# Patient Record
Sex: MALE | Race: BLACK OR AFRICAN AMERICAN | Employment: UNEMPLOYED | URBAN - METROPOLITAN AREA
[De-identification: names, ages, dates, MRNs, and addresses within clinical notes are randomized per-mention and may not be internally consistent; named-entity substitution may affect disease eponyms.]

---

## 2017-08-08 ENCOUNTER — HOSPITAL ENCOUNTER (EMERGENCY)
Age: 27
Discharge: HOME | End: 2017-08-08
Attending: EMERGENCY MEDICINE
Payer: MEDICAID

## 2017-08-08 VITALS
DIASTOLIC BLOOD PRESSURE: 96 MMHG | RESPIRATION RATE: 16 BRPM | HEART RATE: 97 BPM | TEMPERATURE: 99.9 F | HEIGHT: 69 IN | WEIGHT: 269.62 LBS | BODY MASS INDEX: 39.93 KG/M2 | OXYGEN SATURATION: 100 % | SYSTOLIC BLOOD PRESSURE: 192 MMHG

## 2017-08-08 DIAGNOSIS — J02.0 ACUTE STREPTOCOCCAL PHARYNGITIS: Primary | ICD-10-CM

## 2017-08-08 DIAGNOSIS — R03.0 ELEVATED BLOOD PRESSURE READING: ICD-10-CM

## 2017-08-08 LAB — DEPRECATED S PYO AG THROAT QL EIA: POSITIVE

## 2017-08-08 PROCEDURE — 87880 STREP A ASSAY W/OPTIC: CPT | Performed by: PHYSICIAN ASSISTANT

## 2017-08-08 PROCEDURE — 99283 EMERGENCY DEPT VISIT LOW MDM: CPT

## 2017-08-08 PROCEDURE — 74011250637 HC RX REV CODE- 250/637: Performed by: PHYSICIAN ASSISTANT

## 2017-08-08 RX ORDER — AMOXICILLIN 500 MG/1
500 TABLET, FILM COATED ORAL 3 TIMES DAILY
Qty: 30 TAB | Refills: 0 | Status: SHIPPED | OUTPATIENT
Start: 2017-08-08

## 2017-08-08 RX ORDER — AMOXICILLIN 250 MG/1
500 CAPSULE ORAL
Status: COMPLETED | OUTPATIENT
Start: 2017-08-08 | End: 2017-08-08

## 2017-08-08 RX ORDER — HYDROCODONE BITARTRATE AND ACETAMINOPHEN 5; 325 MG/1; MG/1
1 TABLET ORAL
Qty: 20 TAB | Refills: 0 | Status: SHIPPED | OUTPATIENT
Start: 2017-08-08

## 2017-08-08 RX ORDER — DEXAMETHASONE SODIUM PHOSPHATE 4 MG/ML
10 INJECTION, SOLUTION INTRA-ARTICULAR; INTRALESIONAL; INTRAMUSCULAR; INTRAVENOUS; SOFT TISSUE
Status: COMPLETED | OUTPATIENT
Start: 2017-08-08 | End: 2017-08-08

## 2017-08-08 RX ORDER — HYDROCODONE BITARTRATE AND ACETAMINOPHEN 5; 325 MG/1; MG/1
1 TABLET ORAL
Status: COMPLETED | OUTPATIENT
Start: 2017-08-08 | End: 2017-08-08

## 2017-08-08 RX ADMIN — AMOXICILLIN 500 MG: 250 CAPSULE ORAL at 23:37

## 2017-08-08 RX ADMIN — DEXAMETHASONE SODIUM PHOSPHATE 10 MG: 4 INJECTION, SOLUTION INTRAMUSCULAR; INTRAVENOUS at 23:39

## 2017-08-08 RX ADMIN — HYDROCODONE BITARTRATE AND ACETAMINOPHEN 1 TABLET: 5; 325 TABLET ORAL at 23:37

## 2017-08-08 NOTE — LETTER
[address] 
\[facility]\"" EMERGENCY DEPT 
[address] [place] [address] [address] [id number] [phone] Work/School Note Date: 8/8/2017 To Whom It May concern: 
 
Misbah Arias was seen and treated today in the emergency room by the following provider(s): 
Attending Provider: Reddy Wilkes MD 
Physician Assistant: ALEXANDRIA Watters. Misbah Arias may return to work on 8/15/17. Sincerely, Jaye Edouard PA

## 2017-08-08 NOTE — LETTER
[address] 
[facility] EMERGENCY DEPT 
[address] [place] [address] [address] [id number] 
[phone] Work/School Note Date: 8/8/2017 To Whom It May concern: 
 
Conrad Becerra was seen and treated today in the emergency room by the following provider(s): 
Attending Provider: Melvin Weber MD 
Physician Assistant: ALEXANDRIA Rodriguez. Conrad Becerra may return to work on 8/16/17 (Although he may return on 8/12/16, if he is fever free and feeling better.) Sincerely, Teresa Guerra, PA

## 2017-08-09 NOTE — ED NOTES
ALEXANDRIA Pool has reviewed discharge instructions with the patient. The patient verbalized understanding. Pt. A&Ox4, respirations even and unlabored. VS stable as noted in flowsheet. Declined wheelchair, ambulatory from department with paperwork in hand.

## 2017-08-09 NOTE — ED TRIAGE NOTES
Patient arrives ambulatory to ED with aching pains and sore throat since yesterday, patient woke worsening symptoms today. Patient states he has been feeling cold. Patient denies N/V/D. Patient states his head started hurting this morning. Patient had been traveling to [facility] Prior. Patient feels has a slight non-productive cough.

## 2017-08-09 NOTE — ED PROVIDER NOTES
HPI Comments: Almita Long is a 28 y.o. male with PMHx of born with one kidney, presenting ambulatory to ED c/o progressive sore throat since yesterday. Pt rates current pain 5/10 and notes it is worse with swallowing and unchanged with taking Advil. He reports associated myalgias and subjective fever/chills. Pt notes he took Advil most recently 5-6 hours ago. 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 Pt reports his fiance had strep throat three weeks ago. He endorses he was born with one kidney. Pt denies history of DM or liver/thyroid disease. He specifically denies N/V/D.     PCP: None  Social Hx: never smoker; + EtOH (occasional); - drug use. There are no other complaints, changes, or physical findings at this time. Written by JOSH Morales, as dictated by [employer]Forms LINDSAY. The history is provided by the patient. Past Medical History:   Diagnosis Date    Cancer ([facility] [address]     Chronic kidney disease     HX OTHER MEDICAL     born with one kidney       Past Surgical History:   Procedure Laterality Date    HX UROLOGICAL      kidney CA and removal         History reviewed. No pertinent family history. Social History     Social History    Marital status: SINGLE     Spouse name: N/A    Number of children: N/A    Years of education: N/A     Occupational History    Not on file. Social History Main Topics    Smoking status: Never Smoker    Smokeless tobacco: Never Used    Alcohol use No      Comment: from time to time    Drug use: No    Sexual activity: Not on file     Other Topics Concern    Not on file     Social History Narrative         ALLERGIES: Review of patient's allergies indicates no known allergies. Review of Systems   Constitutional: Positive for chills and fever (subjective). HENT: Positive for sore throat. Eyes: Negative. Respiratory: Negative. Cardiovascular: Negative. Gastrointestinal: Negative.   Negative for constipation, diarrhea, nausea and vomiting. Denies liver disease   Endocrine:        Denies thyroid disease   Genitourinary: Negative. Negative for dysuria. Denies kidney disease   Musculoskeletal: Positive for myalgias. Skin: Negative. Neurological: Negative. All other systems reviewed and are negative. Vitals:    08/08/17 2108   BP: (!) 192/96   Pulse: 97   Resp: 16   Temp: 99.9 °F (37.7 °C)   SpO2: 100%   Weight: 122.3 kg (269 lb 10 oz)   Height: 5' 9\" (1.753 m)            Physical Exam   Constitutional: He is oriented to person, place, and time. He appears well-developed and well-nourished. No distress. HENT:   Head: Normocephalic and atraumatic. Right Ear: External ear normal.   Left Ear: External ear normal.   Nose: Nose normal.   Mouth/Throat: No oropharyngeal exudate. Erythema to the pharynx and tonsils; Eyes: Conjunctivae and EOM are normal. Pupils are equal, round, and reactive to light. Right eye exhibits no discharge. Left eye exhibits no discharge. No scleral icterus. Neck: Normal range of motion. Neck supple. No tracheal deviation present. Cardiovascular: Normal rate, regular rhythm, normal heart sounds and intact distal pulses. Exam reveals no gallop and no friction rub. No murmur heard. Pulmonary/Chest: Effort normal and breath sounds normal. No respiratory distress. He has no wheezes. He has no rales. He exhibits no tenderness. Musculoskeletal: He exhibits no edema or tenderness. Lymphadenopathy:     He has no cervical adenopathy. Neurological: He is alert and oriented to person, place, and time. No cranial nerve deficit. Coordination normal.   Skin: Skin is warm and dry. No rash noted. No erythema. Psychiatric: He has a normal mood and affect. His behavior is normal. Judgment and thought content normal.   Nursing note and vitals reviewed.        MDM  Number of Diagnoses or Management Options  Acute streptococcal pharyngitis:   Elevated blood pressure reading: Diagnosis management comments: DDx: strep, elevated BP, viral pharyngitis, fever. Amount and/or Complexity of Data Reviewed  Clinical lab tests: ordered and reviewed  Review and summarize past medical records: yes    Patient Progress  Patient progress: stable    Procedures    LABORATORY TESTS:  Recent Results (from the past 12 hour(s))   STREP AG SCREEN, GROUP A    Collection Time: 08/08/17  9:15 PM   Result Value Ref Range    Group A Strep Ag ID POSITIVE (A) NEG       MEDICATIONS GIVEN:  Medications   amoxicillin (AMOXIL) capsule 500 mg (not administered)   HYDROcodone-acetaminophen (NORCO) 5-325 mg per tablet 1 Tab (not administered)   dexamethasone (DECADRON) 4 mg/mL injection 10 mg (not administered)       IMPRESSION:  1. Acute streptococcal pharyngitis    2. Elevated blood pressure reading        PLAN:  1. Current Discharge Medication List      START taking these medications    Details   HYDROcodone-acetaminophen (NORCO) 5-325 mg per tablet Take 1 Tab by mouth every six (6) hours as needed for Pain. Max Daily Amount: 4 Tabs. Qty: 20 Tab, Refills: 0      amoxicillin 500 mg tab Take 500 mg by mouth three (3) times daily. Qty: 30 Tab, Refills: 0           2. Follow-up Information     Follow up With Details Comments Contact Info    Local clinic  As needed     [facility] EMERGENCY DEPT  If symptoms worsen [address] [place] [address]  [phone]        Return to ED if worse     DISCHARGE NOTE  11:33 PM  The patient has been re-evaluated and is ready for discharge. Reviewed available results with patient. Counseled pt on diagnosis and care plan. Pt has expressed understanding, and all questions have been answered. Pt agrees with plan and agrees to F/U as recommended, or return to the ED if their sxs worsen. Discharge instructions have been provided and explained to the pt, along with reasons to return to the ED.   Written by Bassem Jones ED Scribe, as dictated by Prabhakar Vergara LINDSAY.    This note is prepared by Roberto Ortiz, acting as Scribe for KeyComarlon Patel PA-C: The scribe's documentation has been prepared under my direction and personally reviewed by me in its entirety. I confirm that the note above accurately reflects all work, treatment, procedures, and medical decision making performed by me.

## 2017-08-09 NOTE — DISCHARGE INSTRUCTIONS
[facility] SYSTEM[facility] Departments     For adult and child immunizations, family planning, TB screening, STD testing and women's health services. [employer]: "[employer]" [phone]      [facility]   [address]   [address]   170 [facility]: [place] [facility] [address]  [phone]      [address]          [address] [address]     For primary care services, woman and child wellness, and some clinics providing specialty care. [facility] -- [address] [facility] [address] [facility] [phone]   411 [facility] 200 [facility] 3614 [facility] [phone]   [address] [facility] [address] [facility] [address] [phone]   [address]  [employer] [address] [place] [address] 5850  Community  [phone]   7700 [facility] [address] [address] [phone]   [facility] 81 [facility] [phone]   [facility] [address] [place] [address] [phone]   [facility]: [facility] [address] [facility], [address] [phone]     [address] [phone]   [facility] Outreach [address]  Community  [phone]   [employer]  [address] (www.Queryly/about/mission. asp) [phone]         Sexual Health/Woman Wellness Clinics    For STD/HIV testing and treatment, pregnancy testing and services, men's health, birth control services, LGBT services, and hepatitis/HPV vaccine services. [employer] for [employer] [address] [facility] [address] [facility] [address] [facility] 1579 [address] [phone]   [facility] [address], 5th floor [phone]   Pregnancy [address] Women [address]  [facility] [phone]         Specialty Service 1701 [facility]   [phone]   [place]   [phone]   Women, Infant and Children's Services: [address] [phone]       600 [facility]   [phone]   [address]   [facility] Psychiatry     [phone]   [address] Crisis   [address] [phone]     Local Primary Care Physicians  [facility] [phone]  MD Isiah Horton MD Ninfa Dirk, MD [facility] Doctors [phone]  Ulices Clarke, [facility]  MD Judson Pat MD Devora Kaufman, MD [address] [address] [phone]  Sonia Gene[place] [address] [facility] [phone]  MD Jason Valentin, MD Jaqueline Hernández MD   [facility] [phone]  YXVN YGX GA, MD Charlotte [facility], NP [address] [phone]  Mark Villeda, MD Isaiah Flecther MD Katheleen Kempf, MD Marianne Sport, MD Mena Baptise, MD Peyton Bing, MD   [address] 57 [facility]  Gaurang Wellington MD [address] [facility] [facility] [phone]  MD Solomon Crump, NP  MD Mamadou Velazco MD Franklyn Columbia, MD Maurie Crass, MD Bonney Coss, MD   9033 [facility] [phone]  MD Awais Call, P  Marylene Medici, NP  Willena Peach, MD Seth Callow, MD Claude Klein, MD Bob Glee, MD [facility] [phone]  Blanch Shone, MD Atha Forster, MD Harvest Haggis, MD Blanch Oka, MD Lenis Nissen, MD   [facility] [phone]  MD Aminata Greene MD [facility] [phone]  MD Maxine Mendiola MD Addison Dade Lino Purcell, [address] [facility] [phone]  MD Flora Bradshaw, MD Rivka Seeds, MD Reida Patty, MD Julane Gowers, MD Ngozi Saeed, RUBIO Craven MD [address]   [phone]  MD Sulema Allred MD Gioia Acron, MD ALTA [facility] [phone]  [address] [place] [address] Gokli, MD Jolanda Krabbe, P  Jonathan Huge, PA-C  Jonathan Huge, FNP  Kristy Shin, PA-C  MD Hailee Renee, RUBIO Sesay, DO Miscellaneous:  Raimundo Sandoval -[phone]            Elevated Blood Pressure: Care Instructions  Your Care Instructions    Blood pressure is a measure of how hard the blood pushes against the walls of your arteries. It's normal for blood pressure to go up and down throughout the day. But if it stays up over time, you have high blood pressure. Two numbers tell you your blood pressure. The first number is the systolic pressure. It shows how hard the blood pushes when your heart is pumping. The second number is the diastolic pressure. It shows how hard the blood pushes between heartbeats, when your heart is relaxed and filling with blood. An ideal blood pressure in adults is less than 120/80 (say \"120 over 80\"). High blood pressure is 140/90 or higher. You have high blood pressure if your top number is 140 or higher or your bottom number is 90 or higher, or both. The main test for high blood pressure is simple, fast, and painless. To diagnose high blood pressure, your doctor will test your blood pressure at different times. After testing your blood pressure, your doctor may ask you to test it again when you are home. If you are diagnosed with high blood pressure, you can work with your doctor to make a long-term plan to manage it. Follow-up care is a key part of your treatment and safety.  Be sure to make and go to all appointments, and call your doctor if you are having problems. It's also a good idea to know your test results and keep a list of the medicines you take. How can you care for yourself at home? · Do not smoke. Smoking increases your risk for heart attack and stroke. If you need help quitting, talk to your doctor about stop-smoking programs and medicines. These can increase your chances of quitting for good. · Stay at a healthy weight. · Try to limit how much sodium you eat to less than 2,300 milligrams (mg) a day. Your doctor may ask you to try to eat less than 1,500 mg a day. · Be physically active. Get at least 30 minutes of exercise on most days of the week. Walking is a good choice. You also may want to do other activities, such as running, swimming, cycling, or playing tennis or team sports. · Avoid or limit alcohol. Talk to your doctor about whether you can drink any alcohol. · Eat plenty of fruits, vegetables, and low-fat dairy products. Eat less saturated and total fats. · Learn how to check your blood pressure at home. When should you call for help? Call your doctor now or seek immediate medical care if:  · Your blood pressure is much higher than normal (such as 180/110 or higher). · You think high blood pressure is causing symptoms such as:  ¨ Severe headache. ¨ Blurry vision. Watch closely for changes in your health, and be sure to contact your doctor if:  · You do not get better as expected. Where can you learn more? Go to http://gualberto-sammy.info/. Enter L180 in the search box to learn more about \"Elevated Blood Pressure: Care Instructions. \"  Current as of: April 3, 2017  Content Version: 11.3  © 5919-3595 [employer]. Care instructions adapted under license by [employer] (which disclaims liability or warranty for this information).  If you have questions about a medical condition or this instruction, always ask your healthcare professional. [address] any warranty or liability for your use of this information. Strep Throat: Care Instructions  Your Care Instructions    Strep throat is a bacterial infection that causes sudden, severe sore throat and fever. Strep throat, which is caused by bacteria called streptococcus, is treated with antibiotics. Sometimes a strep test is necessary to tell if the sore throat is caused by strep bacteria. Treatment can help ease symptoms and may prevent future problems. Follow-up care is a key part of your treatment and safety. Be sure to make and go to all appointments, and call your doctor if you are having problems. It's also a good idea to know your test results and keep a list of the medicines you take. How can you care for yourself at home? · Take your antibiotics as directed. Do not stop taking them just because you feel better. You need to take the full course of antibiotics. · Strep throat can spread to others until 24 hours after you begin taking antibiotics. During this time, you should avoid contact with other people at work or home, especially infants and children. Do not sneeze or cough on others, and wash your hands often. Keep your drinking glass and eating utensils separate from those of others, and wash these items well in hot, soapy water. · Gargle with warm salt water at least once each hour to help reduce swelling and make your throat feel better. Use 1 teaspoon of salt mixed in 8 fluid ounces of warm water. · Take an over-the-counter pain medication, such as acetaminophen (Tylenol), ibuprofen (Advil, Motrin), or naproxen (Aleve). Read and follow all instructions on the label. · Try an over-the-counter anesthetic throat spray or throat lozenges, which may help relieve throat pain. · Drink plenty of fluids. Fluids may help soothe an irritated throat. Hot fluids, such as tea or soup, may help your throat feel better. · Eat soft solids and drink plenty of clear liquids.  Flavored ice pops, ice cream, scrambled eggs, sherbet, and gelatin dessert (such as Jell-O) may also soothe the throat. · Get lots of rest.  · Do not smoke, and avoid secondhand smoke. If you need help quitting, talk to your doctor about stop-smoking programs and medicines. These can increase your chances of quitting for good. · Use a vaporizer or humidifier to add moisture to the air in your bedroom. Follow the directions for cleaning the machine. When should you call for help? Call your doctor now or seek immediate medical care if:  · You have a new or higher fever. · You have a fever with a stiff neck or severe headache. · You have new or worse trouble swallowing. · Your sore throat gets much worse on one side. · Your pain becomes much worse on one side of your throat. Watch closely for changes in your health, and be sure to contact your doctor if:  · You are not getting better after 2 days (48 hours). · You do not get better as expected. Where can you learn more? Go to http://gualberto-sammy.info/. Enter K625 in the search box to learn more about \"Strep Throat: Care Instructions. \"  Current as of: July 29, 2016  Content Version: 11.3  © 1646-8424 [employer]. Care instructions adapted under license by [employer] (which disclaims liability or warranty for this information). If you have questions about a medical condition or this instruction, always ask your healthcare professional. [place] [address] any warranty or liability for your use of this information.

## 2019-02-19 ENCOUNTER — OFFICE VISIT (OUTPATIENT)
Dept: URGENT CARE | Age: 29
End: 2019-02-19

## 2019-02-19 VITALS
HEART RATE: 69 BPM | WEIGHT: 272 LBS | BODY MASS INDEX: 38.94 KG/M2 | RESPIRATION RATE: 18 BRPM | HEIGHT: 70 IN | SYSTOLIC BLOOD PRESSURE: 162 MMHG | DIASTOLIC BLOOD PRESSURE: 88 MMHG | OXYGEN SATURATION: 99 % | TEMPERATURE: 97.4 F

## 2019-02-19 DIAGNOSIS — R05.9 COUGH: ICD-10-CM

## 2019-02-19 DIAGNOSIS — J06.9 VIRAL UPPER RESPIRATORY TRACT INFECTION: Primary | ICD-10-CM

## 2019-02-19 PROBLEM — E66.01 SEVERE OBESITY (HCC): Status: ACTIVE | Noted: 2019-02-19

## 2019-02-19 LAB
FLUAV+FLUBV AG NOSE QL IA.RAPID: NEGATIVE POS/NEG
FLUAV+FLUBV AG NOSE QL IA.RAPID: NEGATIVE POS/NEG
S PYO AG THROAT QL: NEGATIVE
VALID INTERNAL CONTROL?: YES
VALID INTERNAL CONTROL?: YES

## 2019-02-19 RX ORDER — GUAIFENESIN 600 MG/1
600 TABLET, EXTENDED RELEASE ORAL 2 TIMES DAILY
Qty: 30 TAB | Refills: 0 | Status: SHIPPED | OUTPATIENT
Start: 2019-02-19 | End: 2019-02-19 | Stop reason: SDUPTHER

## 2019-02-19 RX ORDER — BENZONATATE 100 MG/1
100 CAPSULE ORAL
Qty: 20 CAP | Refills: 0 | Status: SHIPPED | OUTPATIENT
Start: 2019-02-19 | End: 2019-02-26

## 2019-02-19 RX ORDER — GUAIFENESIN 600 MG/1
600 TABLET, EXTENDED RELEASE ORAL 2 TIMES DAILY
Qty: 20 TAB | Refills: 0 | Status: SHIPPED | OUTPATIENT
Start: 2019-02-19

## 2019-02-19 RX ORDER — LISINOPRIL 40 MG/1
40 TABLET ORAL DAILY
COMMUNITY

## 2022-12-12 NOTE — PROGRESS NOTES
32 yo male presents to  for evaluation of cold symptoms which started 2 weeks ago. He has been taking a cough/cold medication that is titrated for blood pressure control for the past couple of weeks. He reports that his cough symptoms have not improved however. His cough is productive and worse with lying down. He endorses cough, left ear pain, rhinnorhea, and itchy eyes. He endorses that he feels cold. No fevers, chlls. +fatigue. No nausea/vomiting, or recent diarrhea. Of note his BP is initially significantly elevated, but he reports he is asymptomatic. Improved on recheck. He has been holding his lisinopril due to his cough, but has taken his amlodipine and hydrochlorothiazide. Past Medical History:   Diagnosis Date    Cancer ([facility] [address]     Chronic kidney disease     HX OTHER MEDICAL     born with one kidney        Past Surgical History:   Procedure Laterality Date    HX UROLOGICAL      kidney CA and removal         History reviewed. No pertinent family history. Social History     Socioeconomic History    Marital status: UNKNOWN     Spouse name: Not on file    Number of children: Not on file    Years of education: Not on file    Highest education level: Not on file   Social Needs    Financial resource strain: Not on file    Food insecurity - worry: Not on file    Food insecurity - inability: Not on file   [employer] needs - medical: Not on file   [employer] needs - non-medical: Not on file   Occupational History    Not on file   Tobacco Use    Smoking status: Never Smoker    Smokeless tobacco: Never Used   Substance and Sexual Activity    Alcohol use: No     Comment: from time to time    Drug use: No    Sexual activity: Not on file   Other Topics Concern    Not on file   Social History Narrative    Not on file                ALLERGIES: Patient has no known allergies. Review of Systems   Constitutional: Positive for fatigue.  Negative for chills, diaphoresis and fever. HENT: Positive for congestion, ear pain, rhinorrhea and sore throat. Negative for postnasal drip, sinus pressure, sinus pain and sneezing. Eyes: Positive for itching. Respiratory: Positive for cough. Negative for chest tightness, shortness of breath and wheezing. Cardiovascular: Negative. Gastrointestinal: Negative. Endocrine: Negative. Genitourinary: Negative. Allergic/Immunologic: Negative. Vitals:    02/19/19 1842   BP: (!) 183/113   Pulse: 69   Resp: 18   Temp: 97.4 °F (36.3 °C)   SpO2: 99%   Weight: 272 lb (123.4 kg)   Height: 5' 10\" (1.778 m)       Physical Exam   Constitutional: He is oriented to person, place, and time. He appears well-developed and well-nourished. He is cooperative. HENT:   Head: Normocephalic and atraumatic. Right Ear: External ear normal.   Left Ear: External ear normal.   Ears:    Nose: Mucosal edema and rhinorrhea present. Right sinus exhibits no maxillary sinus tenderness and no frontal sinus tenderness. Left sinus exhibits no maxillary sinus tenderness and no frontal sinus tenderness. Mouth/Throat: Oropharyngeal exudate present. Eyes: Conjunctivae are normal. Pupils are equal, round, and reactive to light. Neck: Normal range of motion. Neck supple. Cardiovascular: Normal rate and regular rhythm. Patient reports white coat syndrome  BP rechecked and improved. Pulmonary/Chest: Effort normal and breath sounds normal. No respiratory distress. He has no wheezes. Lymphadenopathy:     He has no cervical adenopathy. Neurological: He is alert and oriented to person, place, and time. Skin: Skin is warm and dry. No rash noted. No erythema. No pallor. Psychiatric: He has a normal mood and affect. His behavior is normal. Thought content normal.   Nursing note and vitals reviewed.       MDM     Differential Diagnosis; Clinical Impression; Plan:     Recent Results (from the past 12 hour(s))  -AMB POC RAPID STREP A  Collection Time: 02/19/19  7:14 PM       Result                      Value             Ref Range           VALID INTERNAL CONTROL*     Yes                                   Group A Strep Ag            Negative          Negative       -AMB POC EVY INFLUENZA A/B TEST  Collection Time: 02/19/19  7:15 PM       Result                      Value             Ref Range           VALID INTERNAL CONTROL*     Yes                                   Influenza A Ag POC          Negative          Negative Pos*       Influenza B Ag POC          Negative          Negative Pos*    Upper Respiratory Infection, NOS, likely viral.    Plan:  Encouraged hydration. Reviewed medications and symptomatic management  Follow up with PCP or UC if symptoms persist or worsen  Patient education provided. Reviewed that throat culture has been sent and if results demonstrate atypical strep the patient will be called with the results to be started on abx. Orders Placed This Encounter      DISCONTD: guaiFENesin ER (MUCINEX) 600 mg ER tablet          Sig: Take 1 Tab by mouth two (2) times a day. Dispense:  30 Tab          Refill:  0      benzonatate (TESSALON) 100 mg capsule          Sig: Take 1 Cap by mouth three (3) times daily as needed for Cough for up to 7 days. Dispense:  20 Cap          Refill:  0      guaiFENesin ER (MUCINEX) 600 mg ER tablet          Sig: Take 1 Tab by mouth two (2) times a day. Dispense:  20 Tab          Refill:  0    The patients condition was discussed with the patient and they understand. The patient is to follow up with PCP. If signs and symptoms become worse the pt is to go to the ER. The patient is to take medications as prescribed.        Procedures Biopsy Method: Acu-Razor